# Patient Record
Sex: FEMALE | Race: ASIAN | URBAN - METROPOLITAN AREA
[De-identification: names, ages, dates, MRNs, and addresses within clinical notes are randomized per-mention and may not be internally consistent; named-entity substitution may affect disease eponyms.]

---

## 2022-11-15 ENCOUNTER — OFFICE VISIT (OUTPATIENT)
Dept: FAMILY MEDICINE CLINIC | Facility: CLINIC | Age: 32
End: 2022-11-15
Payer: COMMERCIAL

## 2022-11-15 VITALS
HEIGHT: 63 IN | DIASTOLIC BLOOD PRESSURE: 68 MMHG | OXYGEN SATURATION: 99 % | HEART RATE: 92 BPM | BODY MASS INDEX: 22.68 KG/M2 | RESPIRATION RATE: 16 BRPM | SYSTOLIC BLOOD PRESSURE: 110 MMHG | WEIGHT: 128 LBS

## 2022-11-15 DIAGNOSIS — R20.0 NUMBNESS: ICD-10-CM

## 2022-11-15 DIAGNOSIS — G50.0 TRIGEMINAL NEURALGIA OF LEFT SIDE OF FACE: Primary | ICD-10-CM

## 2022-11-15 PROCEDURE — 3008F BODY MASS INDEX DOCD: CPT | Performed by: FAMILY MEDICINE

## 2022-11-15 PROCEDURE — 3074F SYST BP LT 130 MM HG: CPT | Performed by: FAMILY MEDICINE

## 2022-11-15 PROCEDURE — 3078F DIAST BP <80 MM HG: CPT | Performed by: FAMILY MEDICINE

## 2022-11-15 PROCEDURE — 99204 OFFICE O/P NEW MOD 45 MIN: CPT | Performed by: FAMILY MEDICINE

## 2022-11-15 RX ORDER — PREDNISONE 20 MG/1
40 TABLET ORAL DAILY
Qty: 10 TABLET | Refills: 0 | Status: SHIPPED | OUTPATIENT
Start: 2022-11-15 | End: 2022-11-20

## 2022-11-15 RX ORDER — HYDROQUINONE 40 MG/G
1 CREAM TOPICAL 2 TIMES DAILY
COMMUNITY
Start: 2021-10-26

## 2022-11-20 ENCOUNTER — TELEPHONE (OUTPATIENT)
Dept: FAMILY MEDICINE CLINIC | Facility: CLINIC | Age: 32
End: 2022-11-20

## 2022-11-20 NOTE — TELEPHONE ENCOUNTER
Had done at [facility], had drawn on Friday. Was told lab report was sent to me. Hasn't seen any benefit from the medication. Will review labs when available.     Alex Henderson MD

## 2022-11-21 ENCOUNTER — TELEPHONE (OUTPATIENT)
Dept: FAMILY MEDICINE CLINIC | Facility: CLINIC | Age: 32
End: 2022-11-21

## 2022-11-21 DIAGNOSIS — R20.0 NUMBNESS: ICD-10-CM

## 2022-11-21 DIAGNOSIS — G50.0 TRIGEMINAL NEURALGIA OF LEFT SIDE OF FACE: Primary | ICD-10-CM

## 2022-11-21 NOTE — TELEPHONE ENCOUNTER
I have the lab work, it's normal,     I think referral is reasonable, we can also initiate treatment for possible trigeminal neuralgia, we usual trial a medication oxcarbazepine, if she wishes to initiate I\"m ok with doing so, or can wait for neurology kavon Martel MD

## 2023-01-17 ENCOUNTER — TELEPHONE (OUTPATIENT)
Dept: FAMILY MEDICINE CLINIC | Facility: CLINIC | Age: 33
End: 2023-01-17

## 2023-01-17 DIAGNOSIS — G50.0 TRIGEMINAL NEURALGIA OF LEFT SIDE OF FACE: Primary | ICD-10-CM

## 2023-01-17 NOTE — TELEPHONE ENCOUNTER
PT HAS AN APPOINTMENT WITH DR HERNADEZ.  1-26-23  APPT    SHE NEEDS A REFERRAL BECAUSE INSURANCE IS NOW [facility].  (ENTERED IN SYSTEM)

## 2023-01-26 ENCOUNTER — TELEPHONE (OUTPATIENT)
Dept: NEUROLOGY | Facility: CLINIC | Age: 33
End: 2023-01-26

## 2023-01-26 ENCOUNTER — OFFICE VISIT (OUTPATIENT)
Dept: NEUROLOGY | Facility: CLINIC | Age: 33
End: 2023-01-26
Payer: COMMERCIAL

## 2023-01-26 VITALS
DIASTOLIC BLOOD PRESSURE: 70 MMHG | RESPIRATION RATE: 16 BRPM | SYSTOLIC BLOOD PRESSURE: 113 MMHG | WEIGHT: 131 LBS | HEIGHT: 63 IN | HEART RATE: 80 BPM | BODY MASS INDEX: 23.21 KG/M2

## 2023-01-26 DIAGNOSIS — H69.82 DYSFUNCTION OF LEFT EUSTACHIAN TUBE: ICD-10-CM

## 2023-01-26 DIAGNOSIS — M26.622 ARTHRALGIA OF LEFT TEMPOROMANDIBULAR JOINT: Primary | ICD-10-CM

## 2023-01-26 PROCEDURE — 3008F BODY MASS INDEX DOCD: CPT | Performed by: PHYSICIAN ASSISTANT

## 2023-01-26 PROCEDURE — 3074F SYST BP LT 130 MM HG: CPT | Performed by: PHYSICIAN ASSISTANT

## 2023-01-26 PROCEDURE — 3078F DIAST BP <80 MM HG: CPT | Performed by: PHYSICIAN ASSISTANT

## 2023-01-26 PROCEDURE — 99203 OFFICE O/P NEW LOW 30 MIN: CPT | Performed by: PHYSICIAN ASSISTANT

## 2023-01-26 RX ORDER — PNV NO.95/FERROUS FUM/FOLIC AC 28MG-0.8MG
1 TABLET ORAL DAILY
COMMUNITY

## 2023-01-27 PROBLEM — H69.90 EUSTACHIAN TUBE DYSFUNCTION: Status: RESOLVED | Noted: 2023-01-27 | Resolved: 2023-01-27

## 2023-01-27 PROBLEM — M26.629 TEMPOROMANDIBULAR JOINT (TMJ) PAIN: Status: RESOLVED | Noted: 2023-01-27 | Resolved: 2023-01-27

## 2023-01-27 PROBLEM — H69.80 EUSTACHIAN TUBE DYSFUNCTION: Status: RESOLVED | Noted: 2023-01-27 | Resolved: 2023-01-27

## 2023-01-27 PROBLEM — M26.629 TEMPOROMANDIBULAR JOINT (TMJ) PAIN: Status: ACTIVE | Noted: 2023-01-27

## 2023-01-27 PROBLEM — H69.90 EUSTACHIAN TUBE DYSFUNCTION: Status: ACTIVE | Noted: 2023-01-27

## 2023-01-27 PROBLEM — H69.80 EUSTACHIAN TUBE DYSFUNCTION: Status: ACTIVE | Noted: 2023-01-27

## 2023-07-24 LAB
ANTIBODY SCREEN OB: NEGATIVE
HEPATITIS B SURFACE ANTIGEN OB: NEGATIVE
HIV RESULT OB: NEGATIVE
RH FACTOR OB: POSITIVE

## 2023-09-04 ENCOUNTER — OFFICE VISIT (OUTPATIENT)
Dept: FAMILY MEDICINE CLINIC | Facility: CLINIC | Age: 33
End: 2023-09-04
Payer: COMMERCIAL

## 2023-09-04 VITALS
RESPIRATION RATE: 18 BRPM | OXYGEN SATURATION: 99 % | HEART RATE: 93 BPM | WEIGHT: 131 LBS | SYSTOLIC BLOOD PRESSURE: 109 MMHG | TEMPERATURE: 99 F | DIASTOLIC BLOOD PRESSURE: 67 MMHG | BODY MASS INDEX: 23.21 KG/M2 | HEIGHT: 63 IN

## 2023-09-04 DIAGNOSIS — R05.9 COUGH, UNSPECIFIED TYPE: Primary | ICD-10-CM

## 2024-01-23 LAB — HIV RESULT OB: NEGATIVE

## 2024-02-22 LAB — STREP GP B CULT OB: NEGATIVE

## 2024-03-15 ENCOUNTER — HOSPITAL ENCOUNTER (INPATIENT)
Facility: HOSPITAL | Age: 34
LOS: 2 days | Discharge: HOME | End: 2024-03-17
Attending: STUDENT IN AN ORGANIZED HEALTH CARE EDUCATION/TRAINING PROGRAM | Admitting: STUDENT IN AN ORGANIZED HEALTH CARE EDUCATION/TRAINING PROGRAM
Payer: COMMERCIAL

## 2024-03-15 ENCOUNTER — ANESTHESIA EVENT (OUTPATIENT)
Dept: OBGYN UNIT | Facility: HOSPITAL | Age: 34
End: 2024-03-15
Payer: COMMERCIAL

## 2024-03-15 ENCOUNTER — ANESTHESIA (OUTPATIENT)
Dept: OBGYN UNIT | Facility: HOSPITAL | Age: 34
End: 2024-03-15
Payer: COMMERCIAL

## 2024-03-15 PROBLEM — Z34.90 PREGNANCY (HCC): Status: ACTIVE | Noted: 2024-03-15

## 2024-03-15 LAB
ANTIBODY SCREEN: NEGATIVE
BASOPHILS # BLD AUTO: 0.04 X10(3) UL (ref 0–0.2)
BASOPHILS NFR BLD AUTO: 0.4 %
EOSINOPHIL # BLD AUTO: 0.09 X10(3) UL (ref 0–0.7)
EOSINOPHIL NFR BLD AUTO: 0.9 %
ERYTHROCYTE [DISTWIDTH] IN BLOOD BY AUTOMATED COUNT: 14.3 %
HCT VFR BLD AUTO: 36.4 %
HGB BLD-MCNC: 12.2 G/DL
IMM GRANULOCYTES # BLD AUTO: 0.14 X10(3) UL (ref 0–1)
IMM GRANULOCYTES NFR BLD: 1.4 %
LYMPHOCYTES # BLD AUTO: 2.2 X10(3) UL (ref 1–4)
LYMPHOCYTES NFR BLD AUTO: 21.8 %
MCH RBC QN AUTO: 29.9 PG (ref 26–34)
MCHC RBC AUTO-ENTMCNC: 33.5 G/DL (ref 31–37)
MCV RBC AUTO: 89.2 FL
MONOCYTES # BLD AUTO: 1.03 X10(3) UL (ref 0.1–1)
MONOCYTES NFR BLD AUTO: 10.2 %
NEUTROPHILS # BLD AUTO: 6.59 X10 (3) UL (ref 1.5–7.7)
NEUTROPHILS # BLD AUTO: 6.59 X10(3) UL (ref 1.5–7.7)
NEUTROPHILS NFR BLD AUTO: 65.3 %
PLATELET # BLD AUTO: 132 10(3)UL (ref 150–450)
RBC # BLD AUTO: 4.08 X10(6)UL
RH BLOOD TYPE: POSITIVE
T PALLIDUM AB SER QL IA: NONREACTIVE
WBC # BLD AUTO: 10.1 X10(3) UL (ref 4–11)

## 2024-03-15 PROCEDURE — 99204 OFFICE O/P NEW MOD 45 MIN: CPT

## 2024-03-15 PROCEDURE — 86901 BLOOD TYPING SEROLOGIC RH(D): CPT | Performed by: STUDENT IN AN ORGANIZED HEALTH CARE EDUCATION/TRAINING PROGRAM

## 2024-03-15 PROCEDURE — 86900 BLOOD TYPING SEROLOGIC ABO: CPT | Performed by: STUDENT IN AN ORGANIZED HEALTH CARE EDUCATION/TRAINING PROGRAM

## 2024-03-15 PROCEDURE — 85025 COMPLETE CBC W/AUTO DIFF WBC: CPT | Performed by: STUDENT IN AN ORGANIZED HEALTH CARE EDUCATION/TRAINING PROGRAM

## 2024-03-15 PROCEDURE — 86780 TREPONEMA PALLIDUM: CPT | Performed by: STUDENT IN AN ORGANIZED HEALTH CARE EDUCATION/TRAINING PROGRAM

## 2024-03-15 PROCEDURE — 86850 RBC ANTIBODY SCREEN: CPT | Performed by: STUDENT IN AN ORGANIZED HEALTH CARE EDUCATION/TRAINING PROGRAM

## 2024-03-15 RX ORDER — ACETAMINOPHEN 500 MG
1000 TABLET ORAL EVERY 6 HOURS PRN
Status: DISCONTINUED | OUTPATIENT
Start: 2024-03-15 | End: 2024-03-17

## 2024-03-15 RX ORDER — TERBUTALINE SULFATE 1 MG/ML
0.25 INJECTION, SOLUTION SUBCUTANEOUS AS NEEDED
Status: DISCONTINUED | OUTPATIENT
Start: 2024-03-15 | End: 2024-03-15

## 2024-03-15 RX ORDER — NALBUPHINE HYDROCHLORIDE 10 MG/ML
2.5 INJECTION, SOLUTION INTRAMUSCULAR; INTRAVENOUS; SUBCUTANEOUS
Status: DISCONTINUED | OUTPATIENT
Start: 2024-03-15 | End: 2024-03-15

## 2024-03-15 RX ORDER — ACETAMINOPHEN 500 MG
500 TABLET ORAL EVERY 6 HOURS PRN
Status: DISCONTINUED | OUTPATIENT
Start: 2024-03-15 | End: 2024-03-17

## 2024-03-15 RX ORDER — CITRIC ACID/SODIUM CITRATE 334-500MG
30 SOLUTION, ORAL ORAL AS NEEDED
Status: DISCONTINUED | OUTPATIENT
Start: 2024-03-15 | End: 2024-03-15

## 2024-03-15 RX ORDER — IBUPROFEN 600 MG/1
600 TABLET ORAL EVERY 6 HOURS
Status: DISCONTINUED | OUTPATIENT
Start: 2024-03-15 | End: 2024-03-17

## 2024-03-15 RX ORDER — BUPIVACAINE HCL/0.9 % NACL/PF 0.25 %
5 PLASTIC BAG, INJECTION (ML) EPIDURAL AS NEEDED
Status: DISCONTINUED | OUTPATIENT
Start: 2024-03-15 | End: 2024-03-15

## 2024-03-15 RX ORDER — IBUPROFEN 600 MG/1
600 TABLET ORAL ONCE AS NEEDED
Status: DISCONTINUED | OUTPATIENT
Start: 2024-03-15 | End: 2024-03-15

## 2024-03-15 RX ORDER — ACETAMINOPHEN 500 MG
1000 TABLET ORAL EVERY 6 HOURS PRN
Status: DISCONTINUED | OUTPATIENT
Start: 2024-03-15 | End: 2024-03-15

## 2024-03-15 RX ORDER — SIMETHICONE 80 MG
80 TABLET,CHEWABLE ORAL 3 TIMES DAILY PRN
Status: DISCONTINUED | OUTPATIENT
Start: 2024-03-15 | End: 2024-03-17

## 2024-03-15 RX ORDER — MELATONIN
325
COMMUNITY

## 2024-03-15 RX ORDER — SODIUM CHLORIDE, SODIUM LACTATE, POTASSIUM CHLORIDE, CALCIUM CHLORIDE 600; 310; 30; 20 MG/100ML; MG/100ML; MG/100ML; MG/100ML
INJECTION, SOLUTION INTRAVENOUS CONTINUOUS
Status: DISCONTINUED | OUTPATIENT
Start: 2024-03-15 | End: 2024-03-15

## 2024-03-15 RX ORDER — LIDOCAINE HYDROCHLORIDE AND EPINEPHRINE 15; 5 MG/ML; UG/ML
INJECTION, SOLUTION EPIDURAL AS NEEDED
Status: DISCONTINUED | OUTPATIENT
Start: 2024-03-15 | End: 2024-03-15 | Stop reason: SURG

## 2024-03-15 RX ORDER — BISACODYL 10 MG
10 SUPPOSITORY, RECTAL RECTAL ONCE AS NEEDED
Status: DISCONTINUED | OUTPATIENT
Start: 2024-03-15 | End: 2024-03-17

## 2024-03-15 RX ORDER — ONDANSETRON 2 MG/ML
4 INJECTION INTRAMUSCULAR; INTRAVENOUS EVERY 6 HOURS PRN
Status: DISCONTINUED | OUTPATIENT
Start: 2024-03-15 | End: 2024-03-15

## 2024-03-15 RX ORDER — DOCUSATE SODIUM 100 MG/1
100 CAPSULE, LIQUID FILLED ORAL
Status: DISCONTINUED | OUTPATIENT
Start: 2024-03-16 | End: 2024-03-17

## 2024-03-15 RX ORDER — ACETAMINOPHEN 500 MG
500 TABLET ORAL EVERY 6 HOURS PRN
Status: DISCONTINUED | OUTPATIENT
Start: 2024-03-15 | End: 2024-03-15

## 2024-03-15 RX ORDER — DEXTROSE, SODIUM CHLORIDE, SODIUM LACTATE, POTASSIUM CHLORIDE, AND CALCIUM CHLORIDE 5; .6; .31; .03; .02 G/100ML; G/100ML; G/100ML; G/100ML; G/100ML
INJECTION, SOLUTION INTRAVENOUS AS NEEDED
Status: DISCONTINUED | OUTPATIENT
Start: 2024-03-15 | End: 2024-03-15

## 2024-03-15 RX ADMIN — LIDOCAINE HYDROCHLORIDE AND EPINEPHRINE 5 ML: 15; 5 INJECTION, SOLUTION EPIDURAL at 16:58:00

## 2024-03-15 NOTE — ANESTHESIA PREPROCEDURE EVALUATION
PRE-OP EVALUATION    Patient Name: Sherie Mueller    Admit Diagnosis: Pregnancy (HCC) [Z34.90]    Pre-op Diagnosis: * No surgery found *        Anesthesia Procedure: LABOR ANALGESIA    * Surgery not found *    Pre-op vitals reviewed.  Temp: 97.8 °F (36.6 °C)  Pulse: 84  Resp: 16  BP: 124/82  SpO2: 98 %  Body mass index is 26.22 kg/m².    Current medications reviewed.  Hospital Medications:  • lactated ringers infusion   Intravenous Continuous   • dextrose in lactated ringers 5% infusion   Intravenous PRN   • lactated ringers IV bolus 500 mL  500 mL Intravenous PRN   • acetaminophen (Tylenol Extra Strength) tab 500 mg  500 mg Oral Q6H PRN   • acetaminophen (Tylenol Extra Strength) tab 1,000 mg  1,000 mg Oral Q6H PRN   • ibuprofen (Motrin) tab 600 mg  600 mg Oral Once PRN   • ondansetron (Zofran) 4 MG/2ML injection 4 mg  4 mg Intravenous Q6H PRN   • oxyTOCIN in sodium chloride 0.9% (Pitocin) 30 Units/500mL infusion premix  62.5-900 mitul-units/min Intravenous Continuous   • terbutaline (Brethine) 1 MG/ML injection 0.25 mg  0.25 mg Subcutaneous PRN   • sodium citrate-citric acid (Bicitra) 500-334 MG/5ML oral solution 30 mL  30 mL Oral PRN   • oxyTOCIN in sodium chloride 0.9% (Pitocin) 30 Units/500mL infusion premix  0.5-20 mitul-units/min Intravenous Continuous   • lactated ringers IV bolus 1,000 mL  1,000 mL Intravenous Once   • fentaNYL-bupivacaine 2 mcg/mL-0.125% in sodium chloride 0.9% 100 mL EPIDURAL infusion premix  12 mL/hr Epidural Continuous   • fentaNYL (Sublimaze) 50 mcg/mL injection 100 mcg  100 mcg Epidural Once   • EPHEDrine (PF) 25 MG/5 ML injection 5 mg  5 mg Intravenous PRN   • nalbuphine (Nubain) 10 mg/mL injection 2.5 mg  2.5 mg Intravenous Q15 Min PRN       Outpatient Medications:     Medications Prior to Admission   Medication Sig Dispense Refill Last Dose   • ferrous sulfate 325 (65 FE) MG Oral Tab EC Take 1 tablet (325 mg total) by mouth daily with breakfast.   3/14/2024   • docusate sodium 50 MG  Oral Cap Take 1 capsule (50 mg total) by mouth 2 (two) times daily.   3/14/2024   • Prenatal Vit-Fe Fumarate-FA (GNP PRENATAL) 28-0.8 MG Oral Tab Take 1 tablet by mouth daily.   3/14/2024   • Hydroquinone 4 % External Cream Apply 1 Application topically 2 (two) times daily.   More than a month       Allergies: Patient has no known allergies.      Anesthesia Evaluation    Patient summary reviewed.    Anesthetic Complications  (-) history of anesthetic complications         GI/Hepatic/Renal    Negative GI/hepatic/renal ROS.                             Cardiovascular    Negative cardiovascular ROS.                                                   Endo/Other    Negative endo/other ROS.                              Pulmonary    Negative pulmonary ROS.                       Neuro/Psych    Negative neuro/psych ROS.                              Past Surgical History:   Procedure Laterality Date   • APPENDECTOMY     • LIP SURGERY PROC UNLISTED Left     Reconstruction   • SKIN GRAFT       Social History     Socioeconomic History   • Marital status:    Tobacco Use   • Smoking status: Never   • Smokeless tobacco: Never   Substance and Sexual Activity   • Alcohol use: Yes     Comment: 1 per month   Other Topics Concern   • Caffeine Concern Yes     Comment: 3 cups a day tea/coffee   • Exercise Yes     Comment: 5-6 x week     History   Drug Use Not on file     Available pre-op labs reviewed.  Lab Results   Component Value Date    WBC 10.1 03/15/2024    RBC 4.08 03/15/2024    HGB 12.2 03/15/2024    HCT 36.4 03/15/2024    MCV 89.2 03/15/2024    MCH 29.9 03/15/2024    MCHC 33.5 03/15/2024    RDW 14.3 03/15/2024    .0 (L) 03/15/2024               Airway      Mallampati: III  Mouth opening: 3 FB  TM distance: 4 - 6 cm  Neck ROM: full Cardiovascular    Cardiovascular exam normal.         Dental    Dentition appears grossly intact         Pulmonary    Pulmonary exam normal.                 Other findings        ASA: 2    Plan: epidural  NPO status verified and     Post-procedure pain management plan discussed with surgeon and patient.    Comment: Discussed risks and benefits of epidural anesthesia including spinal headache as well as spinal bleeding and infection    Plan/risks discussed with: patient            Present on Admission:  **None**

## 2024-03-15 NOTE — ANESTHESIA PROCEDURE NOTES
Labor Analgesia    Date/Time: 3/15/2024 4:48 PM    Performed by: Khurram Zhang MD  Authorized by: Khurram Zhang MD      General Information and Staff    Start Time:  3/15/2024 4:48 PM  End Time:  3/15/2024 4:58 PM  Anesthesiologist:  Khurram Zhang MD  Performed by:  Anesthesiologist  Patient Location:  OB  Site Identification: surface landmarks    Reason for Block: labor epidural    Preanesthetic Checklist: patient identified, IV checked, risks and benefits discussed, monitors and equipment checked, pre-op evaluation, timeout performed, IV bolus, anesthesia consent and sterile technique used      Procedure Details    Patient Position:  Sitting  Prep: ChloraPrep    Monitoring:  Heart rate and continuous pulse ox  Approach:  Midline    Epidural Needle    Injection Technique:  YASH air  Needle Type:  Tuohy  Needle Gauge:  17 G  Needle Length:  3.375 in  Needle Insertion Depth:  4  Location:  L3-4    Spinal Needle      Catheter    Catheter Type:  End hole  Catheter Size:  19 G  Catheter at Skin Depth:  10  Test Dose:  Negative    Assessment    Sensory Level:  T8    Additional Comments

## 2024-03-15 NOTE — H&P
[facility]  History & Physical    Sherie Mueller Patient Status:  Inpatient    10/6/1990 MRN [id number]   Location [facility] LABOR & DELIVERY Attending Cheryl Cedillo MD   Hosp Day # 0 PCP Tala Lunsford MD     SUBJECTIVE:    Sherie Mueller is a 33 year old  female at 39+1 weeks gestation who is being admitted for PROM.  Her current obstetrical history is significant for gestational thrombocytopenia.  Patient reports SROM at 0300, continued to leak fluid.   Fetal Movement: Good    Obstetric History:   OB History    Para Term  AB Living   1             SAB IAB Ectopic Multiple Live Births                  # Outcome Date GA Lbr Eris/2nd Weight Sex Delivery Anes PTL Lv   1 Current              Past Medical History:   Past Medical History:   Diagnosis Date    Anemia     Thrombocythemia      Past Social History:   Past Surgical History:   Procedure Laterality Date    APPENDECTOMY      LIP SURGERY PROC UNLISTED Left     Reconstruction    SKIN GRAFT       Family History: No family history on file.  Social History:   Social History     Tobacco Use    Smoking status: Never    Smokeless tobacco: Never   Substance Use Topics    Alcohol use: Yes     Comment: 1 per month       Home Meds:   Medications Prior to Admission   Medication Sig Dispense Refill Last Dose    ferrous sulfate 325 (65 FE) MG Oral Tab EC Take 1 tablet (325 mg total) by mouth daily with breakfast.   3/14/2024    docusate sodium 50 MG Oral Cap Take 1 capsule (50 mg total) by mouth 2 (two) times daily.   3/14/2024    Prenatal Vit-Fe Fumarate-FA (GNP PRENATAL) 28-0.8 MG Oral Tab Take 1 tablet by mouth daily.   3/14/2024    Hydroquinone 4 % External Cream Apply 1 Application topically 2 (two) times daily.   More than a month     Allergies: No Known Allergies    OBJECTIVE:    Temp:  [97.6 °F (36.4 °C)-98 °F (36.7 °C)] 97.6 °F (36.4 °C)  Pulse:  [78] 78  Resp:  [18] 18  BP: (135)/(86) 135/86    Lungs:   nonlabored    Heart:   Regular rate   Abdomen: +gravid   Fetal Surveillance:  130/mod/+accels/-decels   TOCO q 5 minutes      Cervix: 50/-1 by RN     Lab Review:       GBS negative  A+  Antibody negative  Rubella immune  Hep b negative  Hiv negative   Rpr negative  Low risk panorama  Ms AFP negative  1 hr 142  3 hr normal  Platelets 135      ASSESSMENT:  Sherie Mueller is a 33 year old  female at 39+1 weeks gestation who is being admitted for PROM.      PLAN:  -admit to labor and delivery  -IVF  -cbc, T&S  -labor: s/p SROM, augment with pitocin if necessary  -GBS negative  -epidural PRN      Risks, benefits, alternatives and possible complications have been discussed in detail with the patient.  Pre-admission, admission, and post admission procedures and expectations were discussed in detail.  All questions answered, all appropriate consents will be signed at the Hospital.        Cheryl Cedillo MD  3/15/2024  12:26 PM

## 2024-03-15 NOTE — PLAN OF CARE
Problem: BIRTH - VAGINAL/ SECTION  Goal: Fetal and maternal status remain reassuring during the birth process  Description: INTERVENTIONS:  - Monitor vital signs  - Monitor fetal heart rate  - Monitor uterine activity  - Monitor labor progression (vaginal delivery)  - DVT prophylaxis (C/S delivery)  - Surgical antibiotic prophylaxis (C/S delivery)  Outcome: Progressing     Problem: PAIN - ADULT  Goal: Verbalizes/displays adequate comfort level or patient's stated pain goal  Description: INTERVENTIONS:  - Encourage pt to monitor pain and request assistance  - Assess pain using appropriate pain scale  - Administer analgesics based on type and severity of pain and evaluate response  - Implement non-pharmacological measures as appropriate and evaluate response  - Consider cultural and social influences on pain and pain management  - Manage/alleviate anxiety  - Utilize distraction and/or relaxation techniques  - Monitor for opioid side effects  - Notify MD/LIP if interventions unsuccessful or patient reports new pain  - Anticipate increased pain with activity and pre-medicate as appropriate  Outcome: Progressing     Problem: ANXIETY  Goal: Will report anxiety at manageable levels  Description: INTERVENTIONS:  - Administer medication as ordered  - Teach and rehearse alternative coping skills  - Provide emotional support with 1:1 interaction with staff  Outcome: Progressing     Problem: Patient/Family Goals  Goal: Patient/Family Long Term Goal  Description: Patient's Long Term Goal: safe vaginal delivery      Interventions:  -   - See additional Care Plan goals for specific interventions  Outcome: Progressing  Goal: Patient/Family Short Term Goal  Description: Patient's Short Term Goal: pain control in labor    Interventions:   -   - See additional Care Plan goals for specific interventions  Outcome: Progressing

## 2024-03-15 NOTE — PROGRESS NOTES
Pt is a 33 year old female admitted to TRG2/TRG2-A.     Chief Complaint   Patient presents with    R/o Labor     Leaking and contractions since 3am with contractions worsening with intensity, now contractions have slowed. Still leaking fluid. Small amount on pad with some pink tinged bleeding. Active fetal movement      Pt is  39w1d intra-uterine pregnancy.  History obtained, consents signed. Oriented to room, staff, and plan of care.

## 2024-03-16 LAB
BASOPHILS # BLD AUTO: 0.03 X10(3) UL (ref 0–0.2)
BASOPHILS NFR BLD AUTO: 0.2 %
EOSINOPHIL # BLD AUTO: 0.02 X10(3) UL (ref 0–0.7)
EOSINOPHIL NFR BLD AUTO: 0.1 %
ERYTHROCYTE [DISTWIDTH] IN BLOOD BY AUTOMATED COUNT: 14.4 %
HCT VFR BLD AUTO: 29.6 %
HGB BLD-MCNC: 10.3 G/DL
IMM GRANULOCYTES # BLD AUTO: 0.12 X10(3) UL (ref 0–1)
IMM GRANULOCYTES NFR BLD: 0.8 %
LYMPHOCYTES # BLD AUTO: 2.24 X10(3) UL (ref 1–4)
LYMPHOCYTES NFR BLD AUTO: 15.8 %
MCH RBC QN AUTO: 30.8 PG (ref 26–34)
MCHC RBC AUTO-ENTMCNC: 34.8 G/DL (ref 31–37)
MCV RBC AUTO: 88.6 FL
MONOCYTES # BLD AUTO: 1.46 X10(3) UL (ref 0.1–1)
MONOCYTES NFR BLD AUTO: 10.3 %
NEUTROPHILS # BLD AUTO: 10.27 X10 (3) UL (ref 1.5–7.7)
NEUTROPHILS # BLD AUTO: 10.27 X10(3) UL (ref 1.5–7.7)
NEUTROPHILS NFR BLD AUTO: 72.8 %
PLATELET # BLD AUTO: 116 10(3)UL (ref 150–450)
RBC # BLD AUTO: 3.34 X10(6)UL
WBC # BLD AUTO: 14.1 X10(3) UL (ref 4–11)

## 2024-03-16 PROCEDURE — 85025 COMPLETE CBC W/AUTO DIFF WBC: CPT | Performed by: STUDENT IN AN ORGANIZED HEALTH CARE EDUCATION/TRAINING PROGRAM

## 2024-03-16 NOTE — L&D DELIVERY NOTE
Jonny Mueller [[id number]]      Labor Events     labor?: No   steroids?: None  Antibiotics received during labor?: No  Rupture date/time: 3/15/2024 0300     Rupture type: SROM  Fluid color: Clear  Labor type: Spontaneous Onset of Labor  Augmentation: Oxytocin  Indications for augmentation: Ineffective Contraction Pattern  Intrapartum & labor complications: Variable decelerations       Labor Event Times    Labor onset date/time: 3/15/2024 1636  Dilation complete date/time: 3/15/2024 1913  Start pushing date/time: 3/15/2024 1928        Presentation    Presentation: Vertex  Position: Occiput Anterior       Operative Delivery    Operative Vaginal Delivery: Yes  Forceps attempted?: No  Vacuum extractor attempted?: Yes  Indications: Maternal Fatigue   Vacuum type: Kiwi Omni Cup (mushroom)   Number of pop offs: 0      Failed?: No              Shoulder Dystocia    Shoulder Dystocia: No       Anesthesia    Method: Epidural               Delivery      Head delivery date/time: 3/15/2024 22:51:56   Delivery date/time:  3/15/24 22:52:06   Delivery type: Vaginal, Vacuum (Extractor)    Details:     Delivery location: delivery room       Delivery Providers    Delivering Clinician: Cheryl Cedillo MD   Delivery personnel:  Provider Role   Khushboo Montero, RICKI Baby Nurse   Eunice Prado, RN Delivery Nurse   Carey Tillman, RN Delivery Nurse             Cord    Complications: Nuchal  # of loops: 1  Timed cord clamping: Yes  Time in sec: 30  Cord blood disposition: to lab  Gases sent?: Yes       [place] Measurements      Weight: 2730 g 6 lb 0.3 oz Length: 47.6 cm     Head circum.: 36 cm Chest circum.: 30.5 cm      Abdominal circum.: 26.5 cm           Placenta    Date/time: 3/15/2024 2254  Removal: Spontaneous  Appearance: Intact  Disposition: Pathology       Apgars    No data filed       Skin to Skin    Skin to skin initiated date/time: 3/15/2024 2316  Skin to skin with: Mother        Vaginal Count    Initial count RN: Davey Rios RN  Initial count Tech: Byriene, Christel   Sponges   Sharps    Initial counts 11   0    Final counts 11   3    Final count RN: Eunice Prado, RN  Final count MD: Cheryl Cedillo MD       Lacerations    Episiotomy: Median  Indications for episiotomy: Fetal Intolerance of Labor  Perineal lacerations: None      Vaginal laceration?: No      Cervical laceration?: No    Clitoral laceration?: No                Delivery Summary    Patient was complete and pushing for about 3 hours. Deeper Variable decelerations were noted, discussed vacuum assistance risks and patient verbally consented. Head at 2+ station. Kiwi vacuum applied and over 4 contractions head was past introitus. Vacuum removed over next two contractions mediolateral episitomy was performed on right side and head delivered. Nuchal cord noted and delivered through. The baby was vigorous and was bulb suctioned. The baby was placed on the mother's abdomen. The cord was clamped and cut after a 30 second delay, and cord blood and cord gases were collected. The placenta delivered spontaneously. Her uterus was massaged and was firm. The mediolateral episiotimy was repaired with 2-0 vicryl suture. She tolerated the procedure well.  mL + sponges.    Liveborn male infant  Weight 6#0  Apgars: 8/9

## 2024-03-16 NOTE — PROGRESS NOTES
NURSING ADMISSION NOTE      Patient admitted via Wheelchair  ID bands verified with labor RN.  Oriented to room, care of plan reviewed and patient education discussed.  Safety precautions initiated.  Bed in low position.  Call light in reach.

## 2024-03-16 NOTE — PROGRESS NOTES
OB Progress Note PPD#1    S: Feels well. Ambulating, eating. Pain controlled. Lochia mild. Breastfeeding.  Voiding without difficulty, + flatus,   O:   Blood pressure 103/65, pulse 70, temperature 98.6 °F (37 °C), temperature source Oral, resp. rate 16, weight 148 lb (67.1 kg), SpO2 98%, currently breastfeeding.  Gen: NAD, AAOx3  Breasts: soft, nontender, nonerythematous  Abdomen: soft, nontender, nondistended, fundus firm and nontender  Ext: trace edema      Lab Results  Lab Results   Component Value Date    WBC 14.1 2024    HGB 10.3 2024    HCT 29.6 2024    .0 2024     Recent Labs   Lab 03/15/24  1143 24  0743   RBC 4.08 3.34*   HGB 12.2 10.3*   HCT 36.4 29.6*   MCV 89.2 88.6   MCH 29.9 30.8   MCHC 33.5 34.8   RDW 14.3 14.4   NEPRELIM 6.59 10.27*   WBC 10.1 14.1*   .0* 116.0*                          A/P: PPD#1 s/p , doing well  1. Continue pain control with motrin  2. Breastfeeding   -- given encouragement and support   -- lactation consult PRN  3. Hgb stable at 10.3, no iron needed  4. DVT ppx: ambulating  5. Circumcision declined  6. Low platelets  - stable    Anticipate discharge tomorrow

## 2024-03-16 NOTE — PLAN OF CARE
Problem: BIRTH - VAGINAL/ SECTION  Goal: Fetal and maternal status remain reassuring during the birth process  Description: INTERVENTIONS:  - Monitor vital signs  - Monitor fetal heart rate  - Monitor uterine activity  - Monitor labor progression (vaginal delivery)  - DVT prophylaxis (C/S delivery)  - Surgical antibiotic prophylaxis (C/S delivery)  Outcome: Completed     Problem: PAIN - ADULT  Goal: Verbalizes/displays adequate comfort level or patient's stated pain goal  Description: INTERVENTIONS:  - Encourage pt to monitor pain and request assistance  - Assess pain using appropriate pain scale  - Administer analgesics based on type and severity of pain and evaluate response  - Implement non-pharmacological measures as appropriate and evaluate response  - Consider cultural and social influences on pain and pain management  - Manage/alleviate anxiety  - Utilize distraction and/or relaxation techniques  - Monitor for opioid side effects  - Notify MD/LIP if interventions unsuccessful or patient reports new pain  - Anticipate increased pain with activity and pre-medicate as appropriate  Outcome: Completed     Problem: ANXIETY  Goal: Will report anxiety at manageable levels  Description: INTERVENTIONS:  - Administer medication as ordered  - Teach and rehearse alternative coping skills  - Provide emotional support with 1:1 interaction with staff  Outcome: Completed     Problem: Patient/Family Goals  Goal: Patient/Family Long Term Goal  Description: Patient's Long Term Goal: safe vaginal delivery      Interventions:  -   - See additional Care Plan goals for specific interventions  Outcome: Completed  Goal: Patient/Family Short Term Goal  Description: Patient's Short Term Goal: pain control in labor    Interventions:   -   - See additional Care Plan goals for specific interventions  Outcome: Completed

## 2024-03-16 NOTE — PROGRESS NOTES
Labor Analgesia Follow Up Note    Patient underwent epidural anesthesia for labor analgesia,    Placenta Date/Time: 3/15/2024 10:54 PM     Delivery Date/Time:: 3/15/2024   10:52 PM     /65   Pulse 70   Temp 98.6 °F (37 °C) (Oral)   Resp 16   Wt 67.1 kg (148 lb)   LMP  (LMP Unknown)   SpO2 98%   Breastfeeding Yes   BMI 26.22 kg/m²     Assessment:  Patient seen and no apparent anesthesia related complications.    Thank you for asking us to participate in the care of your patient.

## 2024-03-16 NOTE — PROGRESS NOTES
Patient up to bathroom with assist x 2. Unable to void . Patient straight cathed 900 ml. Patient transferred to mother/baby room 2211 per wheelchair in stable condition with baby and personal belongings.  Accompanied by significant other and staff.  Report given to mother/baby RN.

## 2024-03-17 VITALS
DIASTOLIC BLOOD PRESSURE: 89 MMHG | SYSTOLIC BLOOD PRESSURE: 133 MMHG | OXYGEN SATURATION: 98 % | RESPIRATION RATE: 12 BRPM | TEMPERATURE: 98 F | WEIGHT: 148 LBS | HEART RATE: 78 BPM | BODY MASS INDEX: 26 KG/M2

## 2024-03-17 NOTE — PROGRESS NOTES
OB Progress Note PPD#2    S: Feels well. Ambulating, eating. Pain controlled. Lochia mild. Breastfeeding.  Voiding without difficulty, + flatus,   O:   Blood pressure 114/80, pulse 79, temperature 98 °F (36.7 °C), temperature source Oral, resp. rate 16, weight 148 lb (67.1 kg), SpO2 98%, currently breastfeeding.  Gen: NAD, AAOx3  Breasts: soft, nontender, nonerythematous  Abdomen: soft, nontender, nondistended, fundus firm and nontender  Ext: trace edema      Lab Results  Lab Results   Component Value Date    WBC 14.1 2024    HGB 10.3 2024    HCT 29.6 2024    .0 2024     Recent Labs   Lab 03/15/24  1143 24  0743   RBC 4.08 3.34*   HGB 12.2 10.3*   HCT 36.4 29.6*   MCV 89.2 88.6   MCH 29.9 30.8   MCHC 33.5 34.8   RDW 14.3 14.4   NEPRELIM 6.59 10.27*   WBC 10.1 14.1*   .0* 116.0*                          A/P: PPD#2 s/p , doing well  1. Continue pain control with motrin  2. Breastfeeding   -- given encouragement and support   -- lactation consult PRN  3. Hgb stable at 10.3, no iron needed  4. DVT ppx: ambulating      Discharge home today

## 2024-03-19 ENCOUNTER — TELEPHONE (OUTPATIENT)
Dept: OBGYN UNIT | Facility: HOSPITAL | Age: 34
End: 2024-03-19

## 2024-03-19 NOTE — PROGRESS NOTES
Cradle call completed. Mom and baby care reviewed. All questions answered. Baby goes to pediatrician tomorrow. Cradle call letters sent via [employer].

## (undated) NOTE — LETTER
Dear New MomRohit, we missed you! The nurses of [facility] have tried to reach you by phone to ask if you have any questions regarding your health or the health and care of your new little one.    We hope you are doing well. If, for any reason, you have questions or concerns about your health or your baby’s health, please contact your provider or your pediatrician or family medicine physician regarding your baby.     At [facility], we feel that postpartum support is very important for new families. Please see the enclosed new parent support flyer that lists support programs and resources with both in-person and online options.     Additionally, our Breastfeeding Centers at [facility] and [facility] in [place], offer outpatient visits with our International Board-Certified Lactation   Consultants (IBCLCs) for any breastfeeding concerns or questions you may have.    For issues related to stress, anxiety or depression, we have a Nurturing Mom support group that meets both in-person or online.  There’s also a 24-hour Mom’s Line where you can request a phone call from a clinical therapist for assistance for postpartum depression.    We encourage you to take advantage of these programs and resources as you recover from childbirth and learn to care for your new infant.    Best wishes,    [facility]            [id number]